# Patient Record
Sex: MALE | Race: ASIAN | NOT HISPANIC OR LATINO | URBAN - METROPOLITAN AREA
[De-identification: names, ages, dates, MRNs, and addresses within clinical notes are randomized per-mention and may not be internally consistent; named-entity substitution may affect disease eponyms.]

---

## 2020-06-26 ENCOUNTER — OFFICE VISIT (OUTPATIENT)
Dept: INTERNAL MEDICINE | Facility: CLINIC | Age: 30
End: 2020-06-26
Payer: COMMERCIAL

## 2020-06-26 ENCOUNTER — LAB VISIT (OUTPATIENT)
Dept: LAB | Facility: HOSPITAL | Age: 30
End: 2020-06-26
Attending: FAMILY MEDICINE
Payer: COMMERCIAL

## 2020-06-26 VITALS
DIASTOLIC BLOOD PRESSURE: 92 MMHG | HEART RATE: 103 BPM | WEIGHT: 169.75 LBS | BODY MASS INDEX: 24.3 KG/M2 | OXYGEN SATURATION: 95 % | TEMPERATURE: 98 F | HEIGHT: 70 IN | SYSTOLIC BLOOD PRESSURE: 140 MMHG

## 2020-06-26 DIAGNOSIS — F51.05 INSOMNIA DUE TO OTHER MENTAL DISORDER: ICD-10-CM

## 2020-06-26 DIAGNOSIS — Z11.4 SCREENING FOR HIV (HUMAN IMMUNODEFICIENCY VIRUS): ICD-10-CM

## 2020-06-26 DIAGNOSIS — F99 INSOMNIA DUE TO OTHER MENTAL DISORDER: ICD-10-CM

## 2020-06-26 DIAGNOSIS — Z13.220 SCREENING FOR LIPID DISORDERS: ICD-10-CM

## 2020-06-26 DIAGNOSIS — F41.9 ANXIETY: Primary | ICD-10-CM

## 2020-06-26 DIAGNOSIS — F41.9 ANXIETY: ICD-10-CM

## 2020-06-26 LAB
CHOLEST SERPL-MCNC: 199 MG/DL (ref 120–199)
CHOLEST/HDLC SERPL: 2.8 {RATIO} (ref 2–5)
HDLC SERPL-MCNC: 72 MG/DL (ref 40–75)
HDLC SERPL: 36.2 % (ref 20–50)
LDLC SERPL CALC-MCNC: 112.8 MG/DL (ref 63–159)
NONHDLC SERPL-MCNC: 127 MG/DL
T4 FREE SERPL-MCNC: 1.77 NG/DL (ref 0.71–1.51)
TRIGL SERPL-MCNC: 71 MG/DL (ref 30–150)
TSH SERPL DL<=0.005 MIU/L-ACNC: 1.04 UIU/ML (ref 0.4–4)

## 2020-06-26 PROCEDURE — 99999 PR PBB SHADOW E&M-NEW PATIENT-LVL IV: CPT | Mod: PBBFAC,,, | Performed by: FAMILY MEDICINE

## 2020-06-26 PROCEDURE — 99204 OFFICE O/P NEW MOD 45 MIN: CPT | Mod: S$GLB,,, | Performed by: FAMILY MEDICINE

## 2020-06-26 PROCEDURE — 86703 HIV-1/HIV-2 1 RESULT ANTBDY: CPT

## 2020-06-26 PROCEDURE — 84439 ASSAY OF FREE THYROXINE: CPT

## 2020-06-26 PROCEDURE — 3008F BODY MASS INDEX DOCD: CPT | Mod: CPTII,S$GLB,, | Performed by: FAMILY MEDICINE

## 2020-06-26 PROCEDURE — 80061 LIPID PANEL: CPT

## 2020-06-26 PROCEDURE — 36415 COLL VENOUS BLD VENIPUNCTURE: CPT

## 2020-06-26 PROCEDURE — 99204 PR OFFICE/OUTPT VISIT, NEW, LEVL IV, 45-59 MIN: ICD-10-PCS | Mod: S$GLB,,, | Performed by: FAMILY MEDICINE

## 2020-06-26 PROCEDURE — 84443 ASSAY THYROID STIM HORMONE: CPT

## 2020-06-26 PROCEDURE — 3008F PR BODY MASS INDEX (BMI) DOCUMENTED: ICD-10-PCS | Mod: CPTII,S$GLB,, | Performed by: FAMILY MEDICINE

## 2020-06-26 PROCEDURE — 99999 PR PBB SHADOW E&M-NEW PATIENT-LVL IV: ICD-10-PCS | Mod: PBBFAC,,, | Performed by: FAMILY MEDICINE

## 2020-06-26 RX ORDER — TRAZODONE HYDROCHLORIDE 50 MG/1
50 TABLET ORAL NIGHTLY PRN
Qty: 30 TABLET | Refills: 3 | Status: SHIPPED | OUTPATIENT
Start: 2020-06-26 | End: 2020-07-22 | Stop reason: SDUPTHER

## 2020-06-26 NOTE — PROGRESS NOTES
Subjective:   Patient ID:  Yonatan Jaimes is a 29 y.o. male.    Chief Complaint:  Anxiety, Establish Care, and Insomnia    Past Medical History:   Diagnosis Date    Anxiety     Depression     Insomnia      History reviewed. No pertinent surgical history.  Family History   Problem Relation Age of Onset    Depression Mother     Asthma Mother     Depression Father     Asthma Father     Depression Sister     Colon cancer Neg Hx     Prostate cancer Neg Hx      Review of patient's allergies indicates:  No Known Allergies    Current Outpatient Medications:     traZODone (DESYREL) 50 MG tablet, Take 1 tablet (50 mg total) by mouth nightly as needed for Insomnia., Disp: 30 tablet, Rfl: 3    Patient presents to establish care and treatment for anxiety/insomnia.    No previous visit [facility] system.    Review chart which shows evaluation at [facility] December 2019 for anxiety/insomnia  CBC, CMP normal.    TSH normal.  Free T4 elevated.    Alcohol and urine drug screen negative.  Prescribed Vistaril which patient says was not effective.    Shortly after that sought care [facility]    States discharged on Depakote, trazodone, Risperdal.    Took medications for about 2 weeks and then discontinued all when feeling better.    States did well until recurrence of symptoms approximately 2 weeks ago.  At that time also had some increased harmful thoughts, but none presently.    Family history significant for mental health disorders.    Anxiety  Presents for initial visit. Episode onset: Initially more than 6 months ago, but recent flare up last 2 weeks. The problem has been rapidly worsening. Symptoms include decreased concentration, depressed mood, insomnia, nervous/anxious behavior and restlessness. Patient reports no chest pain, compulsions, confusion, dizziness, dry mouth, excessive worry, feeling of choking, hyperventilation, impotence, irritability, malaise, muscle tension, nausea,  obsessions, palpitations, panic, shortness of breath or suicidal ideas. Symptoms occur constantly. The severity of symptoms is interfering with daily activities. Nothing aggravates the symptoms. The quality of sleep is poor. Nighttime awakenings: several.     Risk factors include family history. His past medical history is significant for anxiety/panic attacks, bipolar disorder (Possibly based on previous medications treated with), depression and hyperthyroidism (No diagnosis, but T4 has been elevated in past). There is no history of anemia, arrhythmia, asthma, CAD, CHF, chronic lung disease, fibromyalgia or suicide attempts. Treatments tried: Vistaril, Depakote, trazodone, Risperdal all reported by patient. The treatment provided significant relief. Compliance with prior treatments has been poor. Prior compliance problems include difficulty with treatment plan and difficulty understanding directions.     Review of Systems   Constitutional: Positive for activity change and fatigue. Negative for appetite change, chills, diaphoresis, fever and irritability.   Respiratory: Negative for chest tightness and shortness of breath.    Cardiovascular: Negative for chest pain, palpitations and leg swelling.   Gastrointestinal: Negative for abdominal pain, constipation, diarrhea, nausea and vomiting.   Genitourinary: Negative for difficulty urinating and impotence.   Musculoskeletal: Negative for back pain, myalgias and neck pain.   Skin: Negative for rash.   Neurological: Negative for dizziness, weakness, light-headedness and headaches.   Psychiatric/Behavioral: Positive for decreased concentration, dysphoric mood and sleep disturbance. Negative for agitation, behavioral problems, confusion, hallucinations, self-injury and suicidal ideas. The patient is nervous/anxious and has insomnia. The patient is not hyperactive.        Objective:   BP (!) 140/92 (BP Location: Left arm, Patient Position: Sitting, BP Method: Medium  "(Manual))   Pulse 103   Temp 97.5 °F (36.4 °C) (Tympanic)   Ht 5' 10" (1.778 m)   Wt 77 kg (169 lb 12.1 oz)   SpO2 95%   BMI 24.36 kg/m²     Physical Exam  Constitutional:       Appearance: He is well-developed.   Cardiovascular:      Rate and Rhythm: Normal rate and regular rhythm.      Heart sounds: Normal heart sounds. No murmur. No friction rub. No gallop.    Pulmonary:      Effort: Pulmonary effort is normal.      Breath sounds: Normal breath sounds. No wheezing, rhonchi or rales.   Abdominal:      General: There is no distension.      Palpations: Abdomen is soft.      Tenderness: There is no abdominal tenderness. There is no guarding or rebound.   Skin:     General: Skin is warm and dry.      Findings: No bruising, burn, laceration or rash.   Neurological:      Mental Status: He is alert and oriented to person, place, and time.      Sensory: No sensory deficit.      Coordination: Coordination normal.      Gait: Gait normal.   Psychiatric:         Attention and Perception: He is inattentive. He does not perceive auditory or visual hallucinations.         Mood and Affect: Mood is anxious and depressed. Mood is not elated. Affect is labile, flat and inappropriate. Affect is not blunt, angry or tearful.         Speech: He is communicative. Speech is delayed. Speech is not rapid and pressured, slurred or tangential.         Behavior: Behavior is slowed and withdrawn. Behavior is not agitated, aggressive, hyperactive or combative.         Thought Content: Thought content normal. Thought content is not paranoid or delusional. Thought content does not include homicidal or suicidal ideation. Thought content does not include homicidal or suicidal plan.         Cognition and Memory: Cognition normal.         Judgment: Judgment normal.       Assessment:     1. Anxiety    2. Insomnia due to other mental disorder    3. Screening for HIV (human immunodeficiency virus)    4. Screening for lipid disorders      Plan: "   Anxiety  Insomnia due to other mental disorder  -     traZODone (DESYREL) 50 MG tablet; Take 1 tablet (50 mg total) by mouth nightly as needed for Insomnia.  Dispense: 30 tablet; Refill: 3  -     T4, free; Future; Expected date: 06/26/2020  -     TSH; Future; Expected date: 06/26/2020  -     Ambulatory referral/consult to Psychiatry; Future; Expected date: 07/03/2020  Restart trazodone 50 mg nightly.    Based on history and previous medications used, advise needs to establish /see psychiatry.    Referral ordered.    Recheck TSH and free T4 says previously abnormal.  Presently not danger to self or others.    Discussed if starts to have harmful thoughts, will need to seek care through ER or new [facility] type facility again.    Patient expresses understanding and agreement with that plan.    RHM  -     HIV 1/2 Ag/Ab (4th Gen); Future; Expected date: 06/26/2020  -     Lipid Panel; Future; Expected date: 06/26/2020    Check labs.  Treat as indicated.    Establish with psychiatry.    Return to clinic 1 month.

## 2020-06-29 LAB — HIV 1+2 AB+HIV1 P24 AG SERPL QL IA: NEGATIVE

## 2020-07-07 ENCOUNTER — TELEPHONE (OUTPATIENT)
Dept: INTERNAL MEDICINE | Facility: CLINIC | Age: 30
End: 2020-07-07

## 2020-07-07 NOTE — TELEPHONE ENCOUNTER
----- Message from Twyla Valentino sent at 7/7/2020  2:55 PM CDT -----  Regarding: [facility]  The caller would like to get a psychiatry referral for the patient. Please call Julee back at [phone] ext [phone]

## 2020-07-08 ENCOUNTER — TELEPHONE (OUTPATIENT)
Dept: INTERNAL MEDICINE | Facility: CLINIC | Age: 30
End: 2020-07-08

## 2020-07-08 DIAGNOSIS — R79.89 ELEVATED SERUM FREE T4 LEVEL: Primary | ICD-10-CM

## 2020-07-08 NOTE — TELEPHONE ENCOUNTER
S/w  with [facility], patient is requesting a psychiatry referral due to having a lot of anxiety.

## 2020-07-08 NOTE — TELEPHONE ENCOUNTER
----- Message from Twyla Valentino sent at 7/7/2020  2:55 PM CDT -----  Regarding: [facility]  The caller would like to get a psychiatry referral for the patient. Please call Julee back at [phone] ext [phone]

## 2020-07-08 NOTE — TELEPHONE ENCOUNTER
----- Message from Yu Bull sent at 7/8/2020  9:08 AM CDT -----  Contact: Karel Leal  Type:  Patient Returning Call    Who Called:Karel Leal  Who Left Message for Patient:Nova  Does the patient know what this is regarding?:yes  Would the patient rather a call back or a response via [facility]? call  Best Call Back Number:[phone] ext [phone]  Additional Information: she states that vm is confidential if she does not answer

## 2020-07-22 ENCOUNTER — OFFICE VISIT (OUTPATIENT)
Dept: PSYCHIATRY | Facility: CLINIC | Age: 30
End: 2020-07-22
Payer: COMMERCIAL

## 2020-07-22 DIAGNOSIS — F41.9 ANXIETY: ICD-10-CM

## 2020-07-22 DIAGNOSIS — F51.05 INSOMNIA DUE TO OTHER MENTAL DISORDER: ICD-10-CM

## 2020-07-22 DIAGNOSIS — F99 INSOMNIA DUE TO OTHER MENTAL DISORDER: ICD-10-CM

## 2020-07-22 DIAGNOSIS — F29 PSYCHOSIS, UNSPECIFIED PSYCHOSIS TYPE: Primary | ICD-10-CM

## 2020-07-22 DIAGNOSIS — F12.10 CANNABIS ABUSE: ICD-10-CM

## 2020-07-22 PROCEDURE — 99999 PR PBB SHADOW E&M-EST. PATIENT-LVL I: CPT | Mod: PBBFAC,,, | Performed by: PSYCHIATRY & NEUROLOGY

## 2020-07-22 PROCEDURE — 90792 PR PSYCHIATRIC DIAGNOSTIC EVALUATION W/MEDICAL SERVICES: ICD-10-PCS | Mod: S$GLB,,, | Performed by: PSYCHIATRY & NEUROLOGY

## 2020-07-22 PROCEDURE — 99999 PR PBB SHADOW E&M-EST. PATIENT-LVL I: ICD-10-PCS | Mod: PBBFAC,,, | Performed by: PSYCHIATRY & NEUROLOGY

## 2020-07-22 PROCEDURE — 90792 PSYCH DIAG EVAL W/MED SRVCS: CPT | Mod: S$GLB,,, | Performed by: PSYCHIATRY & NEUROLOGY

## 2020-07-22 RX ORDER — ESCITALOPRAM OXALATE 10 MG/1
10 TABLET ORAL DAILY
COMMUNITY
Start: 2020-07-02 | End: 2020-07-22 | Stop reason: SDUPTHER

## 2020-07-22 RX ORDER — BENZTROPINE MESYLATE 1 MG/1
1 TABLET ORAL NIGHTLY
COMMUNITY

## 2020-07-22 RX ORDER — RISPERIDONE 2 MG/1
2 TABLET ORAL 2 TIMES DAILY
COMMUNITY
End: 2020-07-22 | Stop reason: SDUPTHER

## 2020-07-22 RX ORDER — RISPERIDONE 2 MG/1
2 TABLET ORAL NIGHTLY
COMMUNITY
Start: 2020-07-02 | End: 2020-07-22

## 2020-07-22 RX ORDER — ESCITALOPRAM OXALATE 10 MG/1
10 TABLET ORAL DAILY
Qty: 30 TABLET | Refills: 1 | Status: SHIPPED | OUTPATIENT
Start: 2020-07-22

## 2020-07-22 RX ORDER — TRAZODONE HYDROCHLORIDE 50 MG/1
50 TABLET ORAL NIGHTLY PRN
Qty: 30 TABLET | Refills: 1 | Status: SHIPPED | OUTPATIENT
Start: 2020-07-22

## 2020-07-22 RX ORDER — RISPERIDONE 2 MG/1
2 TABLET ORAL DAILY
Qty: 30 TABLET | Refills: 1 | Status: SHIPPED | OUTPATIENT
Start: 2020-07-22 | End: 2020-09-20

## 2020-07-22 NOTE — PROGRESS NOTES
"PSYCHIATRIC EVALUATION     Disclaimer: Evaluation and treatment is based on information presented to date. Any new information may affect assessment and findings.     Name: Yontaan Jaimes  Age: 29 y.o.  : 1990      Note: I reviewed Epic EHR_"Encounters" Info for general background info.      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    NO RECORDS accompany.     Sister has schizophrenia. Says they did not tel himhis diagnosis.  Says in apst he was told depression./    He is goal directed calm alert    denies psychosis now;    prior to hospital says he was hearing people talking to him. Says they made him believe sister was in danger.    Says he thought he needed to sacrifice self. Has lacerations on lateral neck ; 6 staples to chest.    No thoughts harm now. No thoughts to hurt others.    shows me photo:  3 bottls of medication:    RISPERDAL 2 MG 1 PO Q HS   TRAZODONE 100 MG AT BED  BENZTROPINE 1 MG A T BED (discussed)   Escitalopram 10 mg po q a    Self Rating Scales: (Such submitted for scanning) :     Quick Inventory of Depression (QID-Short Form):  Zung Anxiety Index:  Mood Disorder Questionnaire (MDQ):   The [employer] Framework: a "bio-psycho-social" screening form for use as clinical raw data. / (submitted for scanning)       PAST PSYCHIATRIC HISTORY:     Inpatient: [facility] (this was 2nd hospitalization); 1st hospitalization was [facility]  Outpatient: (incl. Primary Care): none prior       Allergy Review:   Review of patient's allergies indicates:  No Known Allergies     Medical Problem List:   Denies all  Cardiac: specifically deneis    Surgery: denies   Other (medical) :    Head Injury: n  Seizure: n  Diabetes n  HTN n  Other: n    Substance Use:    Alcohol: social / 1 beer yesterday ; denies control issues     Cannabis " "LAST 2-3 months ago / rarely:  1 or 2 x week / 1st time tried : 14y   Tobacco: in past: when stressed 1 or 2 / miah d stay off   Cocaine: 2x in life / told stay off   Benzo:n  Opioid: n  Ecstasy:n  Other:n    Family History:  Family History   Problem Relation Age of Onset    Depression Mother     Asthma Mother     Depression Father     Asthma Father     Depression Sister     Colon cancer Neg Hx     Prostate cancer Neg Hx       Sister schizophrenia     Mental Status Exam:   Appearance: casual /   Oriented: x 3 / including: Date: 2020; and aware that at: [facility] [place], [place],   Attitude: cooperative engaging pleasant   Eye Contact: good   Behavior: calm   Mood: says Ok   Cognition: alert / 20-3: 17, 14, 11, 8, 5 ,2   Concentration: grossly intact   Affect: appropriate range   Anxiety: mild  Thought Process: goal directed   Speech: Volume : WNL   Quantity WNL   Quality: appears to openly answer questions   Eye Contact: good   Insight: fair to good   Threats: no SI / HI   Memory: intact (as relay information across time spans) : pres: trump ; prior   Psychosis: denies all at present; prior to hospitalization:  the psychosis is as above was new thing / voices + and thought he could read minds  Estimate of Intellectual Function: average   Judgment (to simple situation): if saw a house on fire / A: call 911   Impulse Control: no history SI / nor HI ; calm here     3 wishes? :  House, new car, dog    PSYCHO-SOCIAL DEVELOPMENT HISTORY:     City Born: [place], [place]    Siblings   Brothers: 4  house fire /  / [place] / before he born  Sisters: three  / 1  car accident      Parents : alive    Briefly Describe  your Mom: caring   Briefly Describe your Dad: "problems" / alcohol problems    Bio Mom: Occupation: was seafood process area  Bio Dad:  Occupation: not working ; prior  / seafood process    Marital Status: single     Children   Girls  (ages) n  Boys (ages): n    Physical " Abuse: N     Sexual abuse  N     Other trauma / abuse? 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 n    Assessment:     Encounter Diagnoses   Name Primary?    Psychosis, unspecified psychosis type Yes    Anxiety         PLAN:   Follow up D Post MD aug 11,12, or 13     Meds  See orders  RISPERDAL 2 MG 1 PO Q HS   TRAZODONE 100 MG AT BED   BENZTROPINE 1 MG A T BED / as needed  LEXAPRO 10 mg po q a    Discussed possibility of long acting Invega Sustenna ; he will think about     Records: Request from [facility]     References: (reviewed with pt as well):  Anxiety &  phobia workbook by LORRAINE Caldwell PhD  (web retailers: used: $ 7-10)  Relaxation stress reduction workbook: JESUS Saba PhD ( used: $7-10)  Feeling Good Website: Jayden Linder MD / www.[employer] website (free)   [place]: Path to Better Sleep : https://www.veterantraining.[place].gov/insomnia/ (free)     Advised no cannabis ; gave info on [facility] , [employer] , [employer]; says he plans to stop. Aware UDS prn is possible any visit     [email or web address]     Pt expressed appreciation for the visit today and did not have further question at this time though pt  was still informed to:     Call  if problems.    Call / Report Side Effects to Psyc MD     Encouraged to follow up with primary care / Gen Med MD for continued monitoring of general health and wellness.    Understanding was expressed; and no further concerns nor questions were raised at this time.     remember healthy self care:   eat right  attempt adequate rest   HANDWASHING / encourage such genesis. During this corona virus time   walk or light exercise within reason and as your general med team approves  read or explore any of reference materials / homework mentioned  reach out (I.e.,   connect with)  others who nuture and bring out best in you  avoid risky behaviors  keep your appointments  IF you  cannot make your appt THEN please call or go online to reschedule.  avoid  alcohol and illicit substances.  Look for the positive.  All is often relative-seek balance  Call sooner if needed : [phone]   Call 911 or go to Emergency Room  (ER)  if any acute concerns

## 2020-07-22 NOTE — PATIENT INSTRUCTIONS
PLAN:   Follow up D Post MD aug 11,12, or 13     Meds  See orders  RISPERDAL 2 MG 1 PO Q HS   TRAZODONE 100 MG AT BED   BENZTROPINE 1 MG A T BED / as needed  LEXAPRO 10 mg po q a    Discussed possibility of long acting Invega Sustenna ; he will think about     Records: Request from [facility]     References: (reviewed with pt as well):  Anxiety &  phobia workbook by LORRAINE Caldwell PhD  (web retailers: used: $ 7-10)  Relaxation stress reduction workbook: JESUS Saba PhD ( used: $7-10)  Feeling Good Website: Jayden Linder MD / www.feelingVOIQ.[employer] website (free)   [place]: Path to Better Sleep : https://www.veterantraining.[place]/insomnia/ (free)     Advised no cannabis ; gave info on [facility] , [employer] , [employer]; says he plans to stop. Aware UDS prn is possible any visit     [email or web address]     Pt expressed appreciation for the visit today and did not have further question at this time though pt  was still informed to:     Call  if problems.    Call / Report Side Effects to Psyc MD     Encouraged to follow up with primary care / Gen Med MD for continued monitoring of general health and wellness.    Understanding was expressed; and no further concerns nor questions were raised at this time.     remember healthy self care:   eat right  attempt adequate rest   HANDWASHING / encourage such genesis. During this corona virus time   walk or light exercise within reason and as your general med team approves  read or explore any of reference materials / homework mentioned  reach out (I.e.,  connect with)  others who nuture and bring out best in you  avoid risky behaviors  keep your appointments  IF you  cannot make your appt THEN please call or go online to reschedule.  avoid  alcohol and illicit substances.  Look for the positive.  All is often relative-seek balance  Call sooner if needed : [phone]   Call 911 or go to Emergency Room  (ER)  if any acute concerns

## 2020-08-10 ENCOUNTER — TELEPHONE (OUTPATIENT)
Dept: PSYCHIATRY | Facility: CLINIC | Age: 30
End: 2020-08-10

## 2020-08-10 NOTE — TELEPHONE ENCOUNTER
"Calling per Dr Post instructions:    "I saw this pt x 1 for Aftercare 7-; he had appt for 8- and he cancelled ; tho did not reschedule.         Please call and offer follow up appt within august thank you       also please schedule him for counselor INTAKE / next available: (anxiety / mngt coping skills)    Thank you    Dp"    Tried calling a couple of times and finally left a vm with the Dr's inst and req a c/b.  "

## 2020-08-12 ENCOUNTER — TELEPHONE (OUTPATIENT)
Dept: PSYCHIATRY | Facility: CLINIC | Age: 30
End: 2020-08-12

## 2020-08-13 NOTE — TELEPHONE ENCOUNTER
"Called pt as I saw he cancelled his follow up    Sounded ok    Goal directed    Told me:"I am doing ok"     Says does not think needs meds. I cautioned him of such given circumstances leading to his admisison    No SI / no HI    Goal directed    Urged him to reschedule tho again said he doing ok.    Reminded him to call if desires to reschedule.      I also told him to call 911 or go to ER if acute concerns.    He thanked me for calling.    D  Post MD      "

## 2022-05-23 ENCOUNTER — PATIENT OUTREACH (OUTPATIENT)
Dept: ADMINISTRATIVE | Facility: OTHER | Age: 32
End: 2022-05-23
Payer: COMMERCIAL

## 2022-05-23 NOTE — PROGRESS NOTES
Called patient's mobile and home phone. Phone went to automated message. Automated message states that the caller is not accepting calls at this time and to try the call again later.

## 2022-09-16 ENCOUNTER — PATIENT MESSAGE (OUTPATIENT)
Dept: ADMINISTRATIVE | Facility: HOSPITAL | Age: 32
End: 2022-09-16
Payer: COMMERCIAL